# Patient Record
Sex: MALE | Race: WHITE | NOT HISPANIC OR LATINO
[De-identification: names, ages, dates, MRNs, and addresses within clinical notes are randomized per-mention and may not be internally consistent; named-entity substitution may affect disease eponyms.]

---

## 2017-07-20 ENCOUNTER — APPOINTMENT (OUTPATIENT)
Dept: CARDIOLOGY | Facility: CLINIC | Age: 67
End: 2017-07-20

## 2017-08-09 ENCOUNTER — APPOINTMENT (OUTPATIENT)
Dept: CARDIOLOGY | Facility: CLINIC | Age: 67
End: 2017-08-09
Payer: COMMERCIAL

## 2017-08-09 PROCEDURE — 93306 TTE W/DOPPLER COMPLETE: CPT

## 2017-08-29 ENCOUNTER — APPOINTMENT (OUTPATIENT)
Dept: CARDIOLOGY | Facility: CLINIC | Age: 67
End: 2017-08-29

## 2018-01-15 ENCOUNTER — APPOINTMENT (OUTPATIENT)
Dept: CARDIOLOGY | Facility: CLINIC | Age: 68
End: 2018-01-15
Payer: COMMERCIAL

## 2018-01-15 VITALS
BODY MASS INDEX: 25.46 KG/M2 | WEIGHT: 168 LBS | SYSTOLIC BLOOD PRESSURE: 122 MMHG | DIASTOLIC BLOOD PRESSURE: 80 MMHG | HEIGHT: 68 IN | HEART RATE: 74 BPM

## 2018-01-15 DIAGNOSIS — Z87.19 PERSONAL HISTORY OF OTHER DISEASES OF THE DIGESTIVE SYSTEM: ICD-10-CM

## 2018-01-15 DIAGNOSIS — G47.30 SLEEP APNEA, UNSPECIFIED: ICD-10-CM

## 2018-01-15 DIAGNOSIS — Z86.79 PERSONAL HISTORY OF OTHER DISEASES OF THE CIRCULATORY SYSTEM: ICD-10-CM

## 2018-01-15 PROCEDURE — 99214 OFFICE O/P EST MOD 30 MIN: CPT

## 2018-04-21 ENCOUNTER — MEDICATION RENEWAL (OUTPATIENT)
Age: 68
End: 2018-04-21

## 2018-05-17 ENCOUNTER — APPOINTMENT (OUTPATIENT)
Dept: CARDIOLOGY | Facility: CLINIC | Age: 68
End: 2018-05-17
Payer: COMMERCIAL

## 2018-05-17 PROCEDURE — 93351 STRESS TTE COMPLETE: CPT

## 2018-05-25 ENCOUNTER — APPOINTMENT (OUTPATIENT)
Dept: CARDIOLOGY | Facility: CLINIC | Age: 68
End: 2018-05-25
Payer: COMMERCIAL

## 2018-05-25 VITALS
DIASTOLIC BLOOD PRESSURE: 60 MMHG | HEIGHT: 68 IN | SYSTOLIC BLOOD PRESSURE: 128 MMHG | WEIGHT: 168 LBS | BODY MASS INDEX: 25.46 KG/M2 | HEART RATE: 76 BPM

## 2018-05-25 PROCEDURE — 99214 OFFICE O/P EST MOD 30 MIN: CPT

## 2018-05-25 RX ORDER — RANITIDINE 150 MG/1
150 TABLET ORAL
Qty: 42 | Refills: 0 | Status: DISCONTINUED | COMMUNITY
Start: 2018-01-03 | End: 2018-05-25

## 2018-05-25 RX ORDER — TOBRAMYCIN AND DEXAMETHASONE 3; 1 MG/ML; MG/ML
0.3-0.1 SUSPENSION/ DROPS OPHTHALMIC
Qty: 10 | Refills: 0 | Status: DISCONTINUED | COMMUNITY
Start: 2017-08-07 | End: 2018-05-25

## 2018-11-30 ENCOUNTER — RX RENEWAL (OUTPATIENT)
Age: 68
End: 2018-11-30

## 2018-12-07 ENCOUNTER — MEDICATION RENEWAL (OUTPATIENT)
Age: 68
End: 2018-12-07

## 2018-12-11 ENCOUNTER — RX RENEWAL (OUTPATIENT)
Age: 68
End: 2018-12-11

## 2019-05-24 ENCOUNTER — NON-APPOINTMENT (OUTPATIENT)
Age: 69
End: 2019-05-24

## 2019-05-24 ENCOUNTER — APPOINTMENT (OUTPATIENT)
Dept: CARDIOLOGY | Facility: CLINIC | Age: 69
End: 2019-05-24
Payer: COMMERCIAL

## 2019-05-24 VITALS
WEIGHT: 170 LBS | HEIGHT: 68 IN | HEART RATE: 70 BPM | OXYGEN SATURATION: 96 % | SYSTOLIC BLOOD PRESSURE: 140 MMHG | DIASTOLIC BLOOD PRESSURE: 80 MMHG | BODY MASS INDEX: 25.76 KG/M2

## 2019-05-24 PROCEDURE — 93000 ELECTROCARDIOGRAM COMPLETE: CPT

## 2019-05-24 PROCEDURE — 99214 OFFICE O/P EST MOD 30 MIN: CPT

## 2019-05-24 RX ORDER — OLMESARTAN MEDOXOMIL 20 MG/1
20 TABLET, FILM COATED ORAL DAILY
Qty: 7 | Refills: 0 | Status: DISCONTINUED | COMMUNITY
End: 2019-05-24

## 2019-05-24 NOTE — PHYSICAL EXAM
[General Appearance - Well Developed] : well developed [General Appearance - Well Nourished] : well nourished [Normal Appearance] : normal appearance [Well Groomed] : well groomed [Normal Conjunctiva] : the conjunctiva exhibited no abnormalities [General Appearance - In No Acute Distress] : no acute distress [No Deformities] : no deformities [Eyelids - No Xanthelasma] : the eyelids demonstrated no xanthelasmas [Normal Oral Mucosa] : normal oral mucosa [No Oral Pallor] : no oral pallor [No Oral Cyanosis] : no oral cyanosis [Normal Jugular Venous A Waves Present] : normal jugular venous A waves present [Normal Jugular Venous V Waves Present] : normal jugular venous V waves present [No Jugular Venous Chambers A Waves] : no jugular venous chambers A waves [Respiration, Rhythm And Depth] : normal respiratory rhythm and effort [Auscultation Breath Sounds / Voice Sounds] : lungs were clear to auscultation bilaterally [Exaggerated Use Of Accessory Muscles For Inspiration] : no accessory muscle use [Heart Rate And Rhythm] : heart rate and rhythm were normal [Heart Sounds] : normal S1 and S2 [Abdomen Soft] : soft [Murmurs] : no murmurs present [Abdomen Tenderness] : non-tender [Abnormal Walk] : normal gait [Abdomen Mass (___ Cm)] : no abdominal mass palpated [Nail Clubbing] : no clubbing of the fingernails [Gait - Sufficient For Exercise Testing] : the gait was sufficient for exercise testing [Skin Color & Pigmentation] : normal skin color and pigmentation [Petechial Hemorrhages (___cm)] : no petechial hemorrhages [Cyanosis, Localized] : no localized cyanosis [No Venous Stasis] : no venous stasis [Skin Lesions] : no skin lesions [] : no rash [No Xanthoma] : no  xanthoma was observed [No Skin Ulcers] : no skin ulcer [Oriented To Time, Place, And Person] : oriented to person, place, and time [Mood] : the mood was normal [No Anxiety] : not feeling anxious [Affect] : the affect was normal

## 2019-05-24 NOTE — HISTORY OF PRESENT ILLNESS
[FreeTextEntry1] : The patient is presenting here today for yearly cardiac followup visit. Patient is clinically doing very well. He exercises every day. He denies any chest pains or shortness of breath. No palpitations or syncope.

## 2020-12-30 ENCOUNTER — APPOINTMENT (OUTPATIENT)
Dept: CARDIOLOGY | Facility: CLINIC | Age: 70
End: 2020-12-30
Payer: COMMERCIAL

## 2020-12-30 ENCOUNTER — NON-APPOINTMENT (OUTPATIENT)
Age: 70
End: 2020-12-30

## 2020-12-30 VITALS
WEIGHT: 162 LBS | HEART RATE: 77 BPM | BODY MASS INDEX: 24.55 KG/M2 | SYSTOLIC BLOOD PRESSURE: 130 MMHG | HEIGHT: 68 IN | DIASTOLIC BLOOD PRESSURE: 80 MMHG | OXYGEN SATURATION: 98 %

## 2020-12-30 PROCEDURE — 99072 ADDL SUPL MATRL&STAF TM PHE: CPT

## 2020-12-30 PROCEDURE — 99214 OFFICE O/P EST MOD 30 MIN: CPT

## 2020-12-30 PROCEDURE — 93000 ELECTROCARDIOGRAM COMPLETE: CPT

## 2020-12-30 RX ORDER — LOSARTAN POTASSIUM 100 MG/1
100 TABLET, FILM COATED ORAL DAILY
Qty: 30 | Refills: 0 | Status: DISCONTINUED | COMMUNITY
Start: 2020-01-22 | End: 2020-12-30

## 2020-12-30 NOTE — ASSESSMENT
[FreeTextEntry1] : Hypertension appears to be controlled. he declined beta blocker in the past. He is on losartan. Patient is physically active but not exercising on a regular basis. He denies any chest pains or shortness of breath. Continue primary prevention. he had 3 beat nonsustained ventricular tachycardia during stress test. He declined Holter monitor. And again he declined beta blocker. He is not considering any further testing. Cardiac followup yearly and as needed.

## 2020-12-30 NOTE — PHYSICAL EXAM
[General Appearance - Well Developed] : well developed [Normal Appearance] : normal appearance [Well Groomed] : well groomed [General Appearance - Well Nourished] : well nourished [No Deformities] : no deformities [General Appearance - In No Acute Distress] : no acute distress [Normal Conjunctiva] : the conjunctiva exhibited no abnormalities [Eyelids - No Xanthelasma] : the eyelids demonstrated no xanthelasmas [Normal Oral Mucosa] : normal oral mucosa [No Oral Pallor] : no oral pallor [No Oral Cyanosis] : no oral cyanosis [Normal Jugular Venous A Waves Present] : normal jugular venous A waves present [Normal Jugular Venous V Waves Present] : normal jugular venous V waves present [No Jugular Venous Chambers A Waves] : no jugular venous chambers A waves [Respiration, Rhythm And Depth] : normal respiratory rhythm and effort [Exaggerated Use Of Accessory Muscles For Inspiration] : no accessory muscle use [Auscultation Breath Sounds / Voice Sounds] : lungs were clear to auscultation bilaterally [Heart Rate And Rhythm] : heart rate and rhythm were normal [Heart Sounds] : normal S1 and S2 [Murmurs] : no murmurs present [Abdomen Soft] : soft [Abdomen Tenderness] : non-tender [Abdomen Mass (___ Cm)] : no abdominal mass palpated [Abnormal Walk] : normal gait [Gait - Sufficient For Exercise Testing] : the gait was sufficient for exercise testing [Nail Clubbing] : no clubbing of the fingernails [Cyanosis, Localized] : no localized cyanosis [Petechial Hemorrhages (___cm)] : no petechial hemorrhages [Skin Color & Pigmentation] : normal skin color and pigmentation [] : no rash [No Venous Stasis] : no venous stasis [Skin Lesions] : no skin lesions [No Skin Ulcers] : no skin ulcer [No Xanthoma] : no  xanthoma was observed [Oriented To Time, Place, And Person] : oriented to person, place, and time [Affect] : the affect was normal [Mood] : the mood was normal [No Anxiety] : not feeling anxious

## 2021-08-09 ENCOUNTER — APPOINTMENT (OUTPATIENT)
Dept: OPHTHALMOLOGY | Facility: CLINIC | Age: 71
End: 2021-08-09

## 2021-12-21 ENCOUNTER — APPOINTMENT (OUTPATIENT)
Dept: CARDIOLOGY | Facility: CLINIC | Age: 71
End: 2021-12-21

## 2021-12-29 ENCOUNTER — NON-APPOINTMENT (OUTPATIENT)
Age: 71
End: 2021-12-29

## 2021-12-29 ENCOUNTER — APPOINTMENT (OUTPATIENT)
Dept: CARDIOLOGY | Facility: CLINIC | Age: 71
End: 2021-12-29
Payer: COMMERCIAL

## 2021-12-29 VITALS
HEIGHT: 68 IN | DIASTOLIC BLOOD PRESSURE: 78 MMHG | WEIGHT: 179 LBS | OXYGEN SATURATION: 95 % | SYSTOLIC BLOOD PRESSURE: 142 MMHG | HEART RATE: 80 BPM | BODY MASS INDEX: 27.13 KG/M2

## 2021-12-29 DIAGNOSIS — Z87.898 PERSONAL HISTORY OF OTHER SPECIFIED CONDITIONS: ICD-10-CM

## 2021-12-29 DIAGNOSIS — I47.2 VENTRICULAR TACHYCARDIA: ICD-10-CM

## 2021-12-29 DIAGNOSIS — Z00.00 ENCOUNTER FOR GENERAL ADULT MEDICAL EXAMINATION W/OUT ABNORMAL FINDINGS: ICD-10-CM

## 2021-12-29 PROCEDURE — 99214 OFFICE O/P EST MOD 30 MIN: CPT

## 2021-12-29 PROCEDURE — 93000 ELECTROCARDIOGRAM COMPLETE: CPT

## 2021-12-29 RX ORDER — IRBESARTAN 150 MG/1
150 TABLET ORAL DAILY
Qty: 90 | Refills: 3 | Status: ACTIVE | COMMUNITY
Start: 2021-12-29

## 2021-12-29 NOTE — DISCUSSION/SUMMARY
[FreeTextEntry1] : 72-year-old gentleman with above medical history and active medical problems as noted below\par 1.  Hypertensive heart disease with renal insufficiency no signs of CHF.  Mildly uncontrolled.  Reviewed risk benefits alternatives in presence of renal insufficiency.  Does not want to pursue any additional medication.  Consider addition of amlodipine to the present regimen.  Goal would be to try to lower it less than 130/80.  He will continue to manage sleep apnea.  Continue activity and exercise.  Strongly recommend him to decrease his salt intake.  Continue lower alcohol and caffeine intake.\par 2.  Dyslipidemia.  Elevated LDL cholesterol reviewed.  Risk benefits alternatives discussed.  Does not want to pursue statin therapy.  \par 3.  Murmur.  Prior echocardiogram 2017.  Hypertensive heart and renal disease.  Recommended echocardiogram to be followed.  We will review it on the phone.  He will contact me after the echocardiogram so that appropriate review can be done.\par #4 history of mild carotid atherosclerosis in the past.  Continue lifestyle risk factor modifications.  Again reviewed need for blood pressure control lifestyle modification and lipid management.\par #5 overweight state.  Weight reduction discussed.\par #6 PVCs.  Asymptomatic.  LV ejection fraction evaluation.  He has excellent exercise tolerance without symptoms to suggest unstable CAD.  No history of syncope.  In the past he had declined beta-blockers as per Dr. Jensen note.  And did not want to pursue stress test.\par \par Counseling regarding low saturated fat, salt and carbohydrate intake was reviewed. Active lifestyle and regular. Exercise along with weight management is advised.\par All the above were at length reviewed. Answered all the questions. Thank you very much for this kind referral. Please do not hesitate to give me a call for any question.\par Part of this transcription was done with voice recognition software and phonetically similar errors are common. I apologize for that. Please do not hesitate to call for any questions due to above.\par

## 2021-12-29 NOTE — ASSESSMENT
[FreeTextEntry1] : Reviewed on December 29, 2021\par Recent labs December 27, 2021 sodium 140 potassium 4.7 creatinine 1.43 LFT normal hemoglobin 15.6.\par Labs from August 3, 2021 were also reviewed.\par

## 2021-12-29 NOTE — REASON FOR VISIT
[Symptom and Test Evaluation] : symptom and test evaluation [Arrhythmia/ECG Abnorrmalities] : arrhythmia/ECG abnormalities [Hyperlipidemia] : hyperlipidemia [Hypertension] : hypertension [FreeTextEntry3] : Dr. Mendes [FreeTextEntry1] : 71-year-old gentleman comes in for follow-up consultation.  Last time seen by Dr. Cochran.\par He has no complaint of chest pain.  He has no significant shortness of breath PND orthopnea palpitation dizziness lightheadedness pedal edema\par He has no visual disturbances focal weakness\par He has no claudication pain\par He remains very active continue to work on a regular basis.\par He has no bleeding complications.\par He does like to eat a lot of salt.\par He has been vegan and dietary restrictions.\par He is unable to lose weight.\par He does have sleep apnea and uses CPAP recently according to him not being able to sleep well.

## 2021-12-29 NOTE — HISTORY OF PRESENT ILLNESS
[FreeTextEntry1] : Medical history mainly significant for\par 1.  Essential hypertension.\par 2.  Renal insufficiency.\par 3.  Dyslipidemia.\par 4.  Premature ventricular beats.\par 5.  Overweight state

## 2021-12-29 NOTE — PHYSICAL EXAM
[Well Developed] : well developed [Well Nourished] : well nourished [No Acute Distress] : no acute distress [Normal Conjunctiva] : normal conjunctiva [Normal Venous Pressure] : normal venous pressure [No Carotid Bruit] : no carotid bruit [Normal S1, S2] : normal S1, S2 [No Rub] : no rub [No Gallop] : no gallop [Clear Lung Fields] : clear lung fields [Good Air Entry] : good air entry [No Respiratory Distress] : no respiratory distress  [Normal Gait] : normal gait [No Edema] : no edema [No Cyanosis] : no cyanosis [No Clubbing] : no clubbing [No Varicosities] : no varicosities [No Rash] : no rash [Moves all extremities] : moves all extremities [No Focal Deficits] : no focal deficits [Normal Speech] : normal speech [Alert and Oriented] : alert and oriented [Normal memory] : normal memory [de-identified] : Midsystolic murmur.  1-2 over 6.  At the base. [de-identified] : No bruit

## 2022-01-03 ENCOUNTER — APPOINTMENT (OUTPATIENT)
Dept: OPHTHALMOLOGY | Facility: CLINIC | Age: 72
End: 2022-01-03
Payer: COMMERCIAL

## 2022-01-03 ENCOUNTER — NON-APPOINTMENT (OUTPATIENT)
Age: 72
End: 2022-01-03

## 2022-01-03 PROCEDURE — 92020 GONIOSCOPY: CPT

## 2022-01-03 PROCEDURE — 92014 COMPRE OPH EXAM EST PT 1/>: CPT

## 2022-01-26 ENCOUNTER — APPOINTMENT (OUTPATIENT)
Dept: CARDIOLOGY | Facility: CLINIC | Age: 72
End: 2022-01-26
Payer: COMMERCIAL

## 2022-01-26 PROCEDURE — 93306 TTE W/DOPPLER COMPLETE: CPT

## 2022-01-28 ENCOUNTER — NON-APPOINTMENT (OUTPATIENT)
Age: 72
End: 2022-01-28

## 2022-01-31 ENCOUNTER — NON-APPOINTMENT (OUTPATIENT)
Age: 72
End: 2022-01-31

## 2022-08-12 ENCOUNTER — NON-APPOINTMENT (OUTPATIENT)
Age: 72
End: 2022-08-12

## 2022-08-25 ENCOUNTER — NON-APPOINTMENT (OUTPATIENT)
Age: 72
End: 2022-08-25

## 2022-10-03 ENCOUNTER — APPOINTMENT (OUTPATIENT)
Dept: OPHTHALMOLOGY | Facility: CLINIC | Age: 72
End: 2022-10-03

## 2022-10-05 ENCOUNTER — APPOINTMENT (OUTPATIENT)
Dept: OPHTHALMOLOGY | Facility: CLINIC | Age: 72
End: 2022-10-05

## 2022-10-12 ENCOUNTER — APPOINTMENT (OUTPATIENT)
Dept: OPHTHALMOLOGY | Facility: CLINIC | Age: 72
End: 2022-10-12

## 2022-10-12 ENCOUNTER — NON-APPOINTMENT (OUTPATIENT)
Age: 72
End: 2022-10-12

## 2022-10-12 PROCEDURE — 92014 COMPRE OPH EXAM EST PT 1/>: CPT

## 2022-10-12 PROCEDURE — 92020 GONIOSCOPY: CPT

## 2023-01-10 ENCOUNTER — APPOINTMENT (OUTPATIENT)
Dept: CARDIOLOGY | Facility: CLINIC | Age: 73
End: 2023-01-10
Payer: COMMERCIAL

## 2023-01-10 ENCOUNTER — NON-APPOINTMENT (OUTPATIENT)
Age: 73
End: 2023-01-10

## 2023-01-10 VITALS
DIASTOLIC BLOOD PRESSURE: 100 MMHG | WEIGHT: 176 LBS | BODY MASS INDEX: 26.76 KG/M2 | HEART RATE: 75 BPM | TEMPERATURE: 97.8 F | SYSTOLIC BLOOD PRESSURE: 156 MMHG | OXYGEN SATURATION: 95 %

## 2023-01-10 PROCEDURE — 93000 ELECTROCARDIOGRAM COMPLETE: CPT

## 2023-01-10 PROCEDURE — 99214 OFFICE O/P EST MOD 30 MIN: CPT | Mod: 25

## 2023-01-10 NOTE — ASSESSMENT
[FreeTextEntry1] : Hypertension appears to be controlled. he declined beta blocker in the past. He is on losartan. Patient is physically active but not exercising on a regular basis. He denies any chest pains or shortness of breath. Continue primary prevention. he had 3 beat nonsustained ventricular tachycardia during stress test. He declined Holter monitor. And again he declined beta blocker. He is not considering any further testing. Cardiac followup yearly and as needed.\par I rechecked his blood pressure today.  It was 130/78.  Exercise and low-sodium diet discussed with the patient.  ECG was reviewed.  No changes.

## 2023-02-17 ENCOUNTER — NON-APPOINTMENT (OUTPATIENT)
Age: 73
End: 2023-02-17

## 2023-02-22 ENCOUNTER — NON-APPOINTMENT (OUTPATIENT)
Age: 73
End: 2023-02-22

## 2023-04-29 ENCOUNTER — NON-APPOINTMENT (OUTPATIENT)
Age: 73
End: 2023-04-29

## 2023-05-19 ENCOUNTER — NON-APPOINTMENT (OUTPATIENT)
Age: 73
End: 2023-05-19

## 2023-05-19 ENCOUNTER — APPOINTMENT (OUTPATIENT)
Dept: OPHTHALMOLOGY | Facility: CLINIC | Age: 73
End: 2023-05-19
Payer: COMMERCIAL

## 2023-05-19 PROCEDURE — 99214 OFFICE O/P EST MOD 30 MIN: CPT

## 2023-05-20 ENCOUNTER — NON-APPOINTMENT (OUTPATIENT)
Age: 73
End: 2023-05-20

## 2023-10-06 ENCOUNTER — NON-APPOINTMENT (OUTPATIENT)
Age: 73
End: 2023-10-06

## 2024-01-18 ENCOUNTER — APPOINTMENT (OUTPATIENT)
Dept: CARDIOLOGY | Facility: CLINIC | Age: 74
End: 2024-01-18
Payer: COMMERCIAL

## 2024-01-18 ENCOUNTER — NON-APPOINTMENT (OUTPATIENT)
Age: 74
End: 2024-01-18

## 2024-01-18 VITALS
HEART RATE: 72 BPM | WEIGHT: 180 LBS | SYSTOLIC BLOOD PRESSURE: 148 MMHG | OXYGEN SATURATION: 96 % | BODY MASS INDEX: 27.28 KG/M2 | HEIGHT: 68 IN | DIASTOLIC BLOOD PRESSURE: 88 MMHG

## 2024-01-18 DIAGNOSIS — M26.649 ARTHRITIS OF UNSPECIFIED TEMPOROMANDIBULAR JOINT: ICD-10-CM

## 2024-01-18 PROCEDURE — 93000 ELECTROCARDIOGRAM COMPLETE: CPT

## 2024-01-18 PROCEDURE — 99204 OFFICE O/P NEW MOD 45 MIN: CPT | Mod: 25

## 2024-01-18 RX ORDER — MELOXICAM 7.5 MG/1
7.5 TABLET ORAL DAILY
Qty: 60 | Refills: 1 | Status: DISCONTINUED | COMMUNITY
Start: 2024-01-18 | End: 2024-01-18

## 2024-01-18 NOTE — DISCUSSION/SUMMARY
[EKG obtained to assist in diagnosis and management of assessed problem(s)] : EKG obtained to assist in diagnosis and management of assessed problem(s) [FreeTextEntry1] : Patient very pleasant 73-year-old male who presents the office today for his annual cardiac evaluation.  Patient has been seen here previously by Dr. oCchran who is now retired.  He has known hypertension and is taking and tolerant of the irbesartan at 150 mg/day.  Blood pressure is modestly elevated today as he is under stress and in continued pain from right TMJ problems.  Patient has had an MRI at his request to reveal degenerative changes.  He is chronically taking Excedrin and is very interested in having his sleep massk changed as he feels this is pushing his jaw on and causing the arthritic condition..  Patient notes that he is active he is without any suspicious cardiovascular symptoms with exercise.  Blood pressure is modestly elevated but he prefers to process things holistically.  At this point in time he was reassured regarding his cardiovascular status based on his interval cardiac review of systems, physical exam as well as electrocardiogram.  Last available labs in February 2023 were entirely within normal limits with exception of BUN 30 creatinine 1.26 cholesterol 192 HDL 56 triglycerides 98 .  Patient was recommended to consider arthritis strength Tylenol and CBD for his jaw pain.  He was given the name of alternative ear nose and throat doctors for evaluation of possible injection into the TMJ.  He was recommended to remain active, lose 5 pounds and cautioned that if he did not his blood pressure would likely need to be treated in a more aggressive fashion.  Patient return back to the office in 6 weeks for an updated weight, blood pressure check and echocardiogram to evaluate for hypertensive changes related to his blood pressure and to evaluate previously noted mild valvular issues.  Updated laboratory data was requested and he will be called and results are obtained otherwise return in 6 weeks for weight, blood pressure check and echocardiogram.  Joel Goldberg, MD, FACC

## 2024-01-18 NOTE — PHYSICAL EXAM
[Well Developed] : well developed [Well Nourished] : well nourished [No Acute Distress] : no acute distress [Normal Conjunctiva] : normal conjunctiva [No Xanthelasma] : no xanthelasma [Normal Venous Pressure] : normal venous pressure [No Carotid Bruit] : no carotid bruit [Elevated JVD ____cm] : elevated JVD ~Vcm [Normal S1, S2] : normal S1, S2 [No Murmur] : no murmur [No Rub] : no rub [No Gallop] : no gallop [Clear Lung Fields] : clear lung fields [Good Air Entry] : good air entry [No Respiratory Distress] : no respiratory distress  [Soft] : abdomen soft [Non Tender] : non-tender [No Masses/organomegaly] : no masses/organomegaly [Normal] : normal gait [No Edema] : no edema [No Cyanosis] : no cyanosis [No Clubbing] : no clubbing [No Focal Deficits] : no focal deficits

## 2024-01-18 NOTE — CARDIOLOGY SUMMARY
[de-identified] : (1/18/2024): EKG: NSR 66 bpm with a frontal QRS axis -15 degrees otherwise normal trace. [de-identified] : (5/17/2018) Stoney protocol stress test 6 minutes 44 seconds resting heart rate 75 peak 139 92% of predicted heart rate for age.  Blood pressure 138/86 peaking to 204/104.  Baseline echo normal LV internal dimensions and wall thickness no segmental motion abnormalities EF 60.  Conclusions normal hemodynamic response, normal EKG response, augmentation of left ventricular systolic function.  Appropriate heart response hypertensive response test performed on Benicar conclusion normal recommendations initiate Toprol XL 25 patient refused and Holter monitor advised had 3 beats of nonsustained VT [de-identified] : (1/226/2022) ECHOCARDIOGRAM CONCLUSIONS: Mildly thickened mitral valve with mild mitral regurgitation.  Sclerotic 3 leaflet aortic valve with minimal AI.  Normal LA size with an index volume of 24 cc/m.  Mild concentric LVH.  Normal left ventricular dimension and function with an EF of 60%.  Normal diastolic function, normal right atrium, normal right ventricular size and function.  Normal PA pressures of 24.  No significant change compared with 5/17/2018

## 2024-01-18 NOTE — REASON FOR VISIT
[CV Risk Factors and Non-Cardiac Disease] : CV risk factors and non-cardiac disease [Hypertension] : hypertension [FreeTextEntry3] : Dr. Mendes [FreeTextEntry1] : Patient is a 73-year-old white male with known hypertension, previously under the care of Dr. Cochran who has retired.  The patient presents to the office today for his annual follow-up.  Patient notes that he has had no active cardiovascular symptoms but has chronic pain from TMJ, known hypertension for which she is tolerating his ARB but blood pressure is mildly elevated as per the patient's perception from chronic pain and stress.  He presents today to continue his ongoing care with no new or active cardiac concerns or complaints

## 2024-01-18 NOTE — HISTORY OF PRESENT ILLNESS
[FreeTextEntry1] : Patient is a 73-year-old white male well-known to this office.  Previously under the care of Dr. Jensen he is under the primary care of Dr. Mendes has not seen him recently and last set available labs are from February 2023.  Patient has known sleep apnea he wears a mask that he feels is pushing his jaw in and has developed severe TMJ on the right side.  He recently had air travel developed blood behind his right eardrum and temporary loss of hearing.  He did in fact see an ear nose and throat physician and had an MRI as per his own record that revealed severe arthritic changes in his TMJ.  He has been on Excedrin with no significant benefit, he is not sleeping well, under stress and he thinks that may be contributing to his blood pressure which is modestly elevated today.  He is taking his irbesartan at 150 mg/day and remains active.  He does high intensity workouts over a brief period of time and denies headaches, shortness of breath, palpitations and or chest discomfort.  He remains active and normal daily activities also did not provoke any symptomatology.  He presents today to continue his ongoing care with these issues being a primary importance.  Patient notes that he is holistic, is a vegan, has not had a colonoscopy but was unaware Cologuard exists and will discuss with his primary care physician obtaining that evaluation.  Patient otherwise presents with no new or active cardiac concerns or complaints to continue his ongoing cardiac monitoring.

## 2024-01-18 NOTE — REVIEW OF SYSTEMS
[Earache] : earache [Hearing Loss] : hearing loss [Negative] : Neurological [SOB] : no shortness of breath [Dyspnea on exertion] : not dyspnea during exertion [Chest Discomfort] : no chest discomfort [Lower Ext Edema] : no extremity edema [Leg Claudication] : no intermittent leg claudication [Palpitations] : no palpitations [Orthopnea] : no orthopnea [PND] : no PND [Syncope] : no syncope [FreeTextEntry2] : Chronic right TMJ pain

## 2024-01-23 ENCOUNTER — APPOINTMENT (OUTPATIENT)
Dept: OTOLARYNGOLOGY | Facility: CLINIC | Age: 74
End: 2024-01-23

## 2024-02-04 ENCOUNTER — RX RENEWAL (OUTPATIENT)
Age: 74
End: 2024-02-04

## 2024-02-04 RX ORDER — MELOXICAM 7.5 MG/1
7.5 TABLET ORAL
Qty: 21 | Refills: 1 | Status: ACTIVE | COMMUNITY
Start: 2024-01-18 | End: 1900-01-01

## 2024-02-08 ENCOUNTER — APPOINTMENT (OUTPATIENT)
Dept: OTOLARYNGOLOGY | Facility: CLINIC | Age: 74
End: 2024-02-08
Payer: COMMERCIAL

## 2024-02-08 VITALS — HEIGHT: 68 IN | BODY MASS INDEX: 25.76 KG/M2 | WEIGHT: 170 LBS

## 2024-02-08 PROCEDURE — 99203 OFFICE O/P NEW LOW 30 MIN: CPT

## 2024-02-14 NOTE — HISTORY OF PRESENT ILLNESS
[de-identified] : co rt ear 3/23 ad plugging dx ben, had m and t 5/23 longstanding ad mild hearing loss longstanding tinnitus covid 8/22 and tinnitus increased mri jaw  w bone spurs hx sleep apnea co nasal congestion

## 2024-02-14 NOTE — ASSESSMENT
[FreeTextEntry1] : audio 7/23 sn loss symmetric pre  m and t audio w cond loss mri 6/21/23  w condylar head bone spur and fluid noted rt me tmj probable source of otalgia ad vent tube good position rec dental consult re tmj

## 2024-03-14 ENCOUNTER — APPOINTMENT (OUTPATIENT)
Dept: CARDIOLOGY | Facility: CLINIC | Age: 74
End: 2024-03-14
Payer: COMMERCIAL

## 2024-03-14 VITALS
HEIGHT: 68 IN | DIASTOLIC BLOOD PRESSURE: 78 MMHG | WEIGHT: 170 LBS | HEART RATE: 68 BPM | OXYGEN SATURATION: 98 % | SYSTOLIC BLOOD PRESSURE: 138 MMHG | BODY MASS INDEX: 25.76 KG/M2

## 2024-03-14 DIAGNOSIS — I10 ESSENTIAL (PRIMARY) HYPERTENSION: ICD-10-CM

## 2024-03-14 DIAGNOSIS — E78.5 HYPERLIPIDEMIA, UNSPECIFIED: ICD-10-CM

## 2024-03-14 DIAGNOSIS — G47.33 OBSTRUCTIVE SLEEP APNEA (ADULT) (PEDIATRIC): ICD-10-CM

## 2024-03-14 DIAGNOSIS — I13.10 HYPERTENSIVE HEART AND CHRONIC KIDNEY DISEASE W/OUT HEART FAILURE, WITH STAGE 1 THROUGH STAGE 4 CHRONIC KIDNEY DISEASE, OR UNSPECIFIED CHRONIC KIDNEY DISEASE: ICD-10-CM

## 2024-03-14 DIAGNOSIS — I35.1 NONRHEUMATIC AORTIC (VALVE) INSUFFICIENCY: ICD-10-CM

## 2024-03-14 PROCEDURE — 99213 OFFICE O/P EST LOW 20 MIN: CPT

## 2024-03-14 PROCEDURE — 93306 TTE W/DOPPLER COMPLETE: CPT

## 2024-03-14 RX ORDER — AMLODIPINE BESYLATE 2.5 MG/1
2.5 TABLET ORAL
Qty: 90 | Refills: 3 | Status: ACTIVE | COMMUNITY
Start: 2024-03-14 | End: 1900-01-01

## 2024-03-14 NOTE — REASON FOR VISIT
[CV Risk Factors and Non-Cardiac Disease] : CV risk factors and non-cardiac disease [Structural Heart and Valve Disease] : structural heart and valve disease [Hypertension] : hypertension [FreeTextEntry3] : Dr. Mendes [FreeTextEntry1] : Patient is a 73-year-old white male with known hypertension, previously noted mild aortic insufficiency who presents the office today for a weight, blood pressure check and an updated echocardiogram to follow the progression of his known aortic insufficiency.  Patient presents today generally feeling well, taking and tolerating irbesartan as his only antihypertensive therapy and has a new set of blood work on hand revealing that his lipids are now in the low risk range using dietary measures only.

## 2024-03-14 NOTE — REVIEW OF SYSTEMS
[Earache] : earache [Hearing Loss] : hearing loss [Dyspnea on exertion] : not dyspnea during exertion [SOB] : no shortness of breath [Chest Discomfort] : no chest discomfort [Lower Ext Edema] : no extremity edema [Leg Claudication] : no intermittent leg claudication [Palpitations] : no palpitations [PND] : no PND [Orthopnea] : no orthopnea [Syncope] : no syncope [Under Stress] : under stress [Negative] : Neurological [FreeTextEntry2] : Chronic right TMJ pain

## 2024-03-14 NOTE — CARDIOLOGY SUMMARY
[de-identified] : Echo: (1/226/2022) ECHOCARDIOGRAM CONCLUSIONS: Mildly thickened mitral valve with mild mitral regurgitation. Sclerotic 3 leaflet aortic valve with minimal AI. Normal LA size with an index volume of 24 cc/m. Mild concentric LVH. Normal left ventricular dimension and function with an EF of 60%. Normal diastolic function, normal right atrium, normal right ventricular size and function. Normal PA pressures of 24. No significant change compared with    (3/14/2024) ECHOCARDIOGRAPHIC CONCLUSIONS:  Left ventricular systolic function is normal with an ejection fraction of 60 % by Warren's method of disks with an ejection fraction visually estimated at 60 to 65 %. PVC's noted during exam.Mild left ventricular hypertrophy.Normal left ventricular diastolic function.Ascending aorta diameter is dilated, measuring 3.90 cm (indexed 2.04 cm/m).There is mild calcification of the aortic valve leaflets.Moderate aortic regurgitation with a pressure half-time of 585 m/s.  Diastolic and systolic dimensions are within normal limits with an LVEDD of 4.7 cm and an LVESD of 2.2 cm.. Mild mitral valve leaflet calcification.Trace mitral regurgitation.Mild tricuspid regurgitation.Mild pulmonic regurgitation.Estimated pulmonary artery systolic pressure is 27 mmHg.Normal pericardium.No pericardial effusion seen.No prior echocardiogram is available for comparison.

## 2024-03-14 NOTE — DISCUSSION/SUMMARY
[FreeTextEntry1] : Patient is very pleasant 73-year-old male with known hypertension, obstructive sleep apnea, mild to moderate aortic insufficiency who is currently on irbesartan 150 mg yet his blood pressure is less than ideal.  There was lability noted today with initial pressure as high as 172/105 dropping down to 142/90 after 5 minutes.  Earlier blood pressures of the day were within normal limits.  Patient's echocardiogram was reviewed in detail.  He has mild LVH, mild to moderate AI with a pressure half-time of 585 m/s.  The remainder of his heart was structurally sound without evidence of diastolic dysfunction and no LV enlargement.  There was also minimal dilatation of his aortic root.  Patient was informed that additional afterload reduction therapy would be ideal to minimize the long-term effects of aortic insufficiency.  An extensive discussion occurred and he has agreed to initiate amlodipine at 2.5 mg initially with a goal to titrate upwards to 5 mg if tolerated.  Salt restriction, weight loss and continued aerobic exercise was once again recommended.  He was advised to avoid weight lifting with breath-holding.  Patient understands the rationale behind this recommendation.  He will implement the therapy and was recommended to return within 4 months for repeat blood pressure check and reevaluation of his weight loss efforts and exercise routine.  He will continue to use his sleep mask and work on holistic ways to deal with his TMJ pain.  Joel Goldberg, MD, FACC

## 2024-03-14 NOTE — HISTORY OF PRESENT ILLNESS
[FreeTextEntry1] : Patient is a 73-year-old white male well-known to this office.  Previously under the care of Dr. Calloway who is retired.  He was originally seen by me on January 18 and recommended to lose weight, cut salt, become more aggressive with a low-fat diet and return for an updated weight, blood pressure check and echocardiogram to follow the progression of his known aortic insufficiency.   Patient has known sleep apnea he wears a mask that he feels is pushing his jaw in and has developed severe TMJ on the right side.  He is considering injections as acupuncture his failed to help.  He does recognize sleep apnea as a contributing factor to his blood pressure which he acknowledges is labile because of stress.  A temporary loss of hearing after an airplane trip prompted an ENT workup that included an MRI as per his own record that revealed severe arthritic changes in his TMJ.  At the time of his initial visit he noted that he has been on Excedrin with no significant benefit, he was not sleeping well, under stress and he thinks that may be contributing to his blood pressure which is modestly elevated today.  He is taking his irbesartan at 150 mg/day and remains active.  He does high intensity workouts over a brief period of time and denies headaches, shortness of breath, palpitations and or chest discomfort.  He remains active and normal daily activities also did not provoke any symptomatology  Patient noted at his initial visit that he desires to remain holistic, is a vegan and reluctant to consider any additional medication, denies further stress testing and is not interested in any medications that might affect erectile function.

## 2024-05-22 ENCOUNTER — APPOINTMENT (OUTPATIENT)
Dept: OPHTHALMOLOGY | Facility: CLINIC | Age: 74
End: 2024-05-22
Payer: COMMERCIAL

## 2024-05-22 ENCOUNTER — NON-APPOINTMENT (OUTPATIENT)
Age: 74
End: 2024-05-22

## 2024-05-22 PROCEDURE — 92020 GONIOSCOPY: CPT

## 2024-05-22 PROCEDURE — 92014 COMPRE OPH EXAM EST PT 1/>: CPT

## 2024-06-13 ENCOUNTER — APPOINTMENT (OUTPATIENT)
Dept: OPHTHALMOLOGY | Facility: CLINIC | Age: 74
End: 2024-06-13
Payer: SELF-PAY

## 2024-06-13 ENCOUNTER — NON-APPOINTMENT (OUTPATIENT)
Age: 74
End: 2024-06-13

## 2024-06-13 PROCEDURE — 92015 DETERMINE REFRACTIVE STATE: CPT

## 2024-07-15 ENCOUNTER — NON-APPOINTMENT (OUTPATIENT)
Age: 74
End: 2024-07-15

## 2024-07-15 ENCOUNTER — APPOINTMENT (OUTPATIENT)
Dept: OPHTHALMOLOGY | Facility: CLINIC | Age: 74
End: 2024-07-15
Payer: COMMERCIAL

## 2024-07-15 PROCEDURE — 99213 OFFICE O/P EST LOW 20 MIN: CPT

## 2024-07-18 ENCOUNTER — APPOINTMENT (OUTPATIENT)
Dept: CARDIOLOGY | Facility: CLINIC | Age: 74
End: 2024-07-18

## 2024-10-17 ENCOUNTER — NON-APPOINTMENT (OUTPATIENT)
Age: 74
End: 2024-10-17

## 2024-10-17 ENCOUNTER — APPOINTMENT (OUTPATIENT)
Dept: OPHTHALMOLOGY | Facility: CLINIC | Age: 74
End: 2024-10-17
Payer: COMMERCIAL

## 2024-10-17 PROCEDURE — 92012 INTRM OPH EXAM EST PATIENT: CPT

## 2025-05-22 ENCOUNTER — NON-APPOINTMENT (OUTPATIENT)
Age: 75
End: 2025-05-22

## 2025-05-22 ENCOUNTER — APPOINTMENT (OUTPATIENT)
Dept: OPHTHALMOLOGY | Facility: CLINIC | Age: 75
End: 2025-05-22
Payer: COMMERCIAL

## 2025-05-22 PROCEDURE — 92020 GONIOSCOPY: CPT

## 2025-05-22 PROCEDURE — 92014 COMPRE OPH EXAM EST PT 1/>: CPT

## 2025-05-28 ENCOUNTER — APPOINTMENT (OUTPATIENT)
Dept: CARDIOLOGY | Facility: CLINIC | Age: 75
End: 2025-05-28
Payer: COMMERCIAL

## 2025-05-28 ENCOUNTER — NON-APPOINTMENT (OUTPATIENT)
Age: 75
End: 2025-05-28

## 2025-05-28 VITALS
WEIGHT: 164 LBS | OXYGEN SATURATION: 97 % | BODY MASS INDEX: 24.94 KG/M2 | DIASTOLIC BLOOD PRESSURE: 86 MMHG | HEART RATE: 71 BPM | SYSTOLIC BLOOD PRESSURE: 138 MMHG

## 2025-05-28 DIAGNOSIS — I13.10 HYPERTENSIVE HEART AND CHRONIC KIDNEY DISEASE W/OUT HEART FAILURE, WITH STAGE 1 THROUGH STAGE 4 CHRONIC KIDNEY DISEASE, OR UNSPECIFIED CHRONIC KIDNEY DISEASE: ICD-10-CM

## 2025-05-28 DIAGNOSIS — G47.33 OBSTRUCTIVE SLEEP APNEA (ADULT) (PEDIATRIC): ICD-10-CM

## 2025-05-28 DIAGNOSIS — I35.1 NONRHEUMATIC AORTIC (VALVE) INSUFFICIENCY: ICD-10-CM

## 2025-05-28 DIAGNOSIS — I10 ESSENTIAL (PRIMARY) HYPERTENSION: ICD-10-CM

## 2025-05-28 PROCEDURE — 93000 ELECTROCARDIOGRAM COMPLETE: CPT

## 2025-05-28 PROCEDURE — 99214 OFFICE O/P EST MOD 30 MIN: CPT

## 2025-05-28 RX ORDER — TADALAFIL 20 MG/1
20 TABLET, FILM COATED ORAL
Refills: 0 | Status: ACTIVE | COMMUNITY

## 2025-05-28 RX ORDER — ALPRAZOLAM 0.25 MG/1
0.25 TABLET ORAL 3 TIMES DAILY
Qty: 90 | Refills: 0 | Status: ACTIVE | COMMUNITY
Start: 2025-05-28 | End: 1900-01-01

## 2025-05-28 RX ORDER — FLUTICASONE PROPIONATE 110 UG/1
110 AEROSOL, METERED RESPIRATORY (INHALATION) TWICE DAILY
Qty: 1 | Refills: 0 | Status: ACTIVE | COMMUNITY
Start: 2025-05-28 | End: 1900-01-01

## 2025-06-24 ENCOUNTER — RX RENEWAL (OUTPATIENT)
Age: 75
End: 2025-06-24

## 2025-07-18 ENCOUNTER — APPOINTMENT (OUTPATIENT)
Dept: CARDIOLOGY | Facility: CLINIC | Age: 75
End: 2025-07-18
Payer: COMMERCIAL

## 2025-07-18 VITALS
BODY MASS INDEX: 26.3 KG/M2 | OXYGEN SATURATION: 99 % | DIASTOLIC BLOOD PRESSURE: 80 MMHG | WEIGHT: 173 LBS | SYSTOLIC BLOOD PRESSURE: 142 MMHG | HEART RATE: 85 BPM

## 2025-07-18 PROCEDURE — 99213 OFFICE O/P EST LOW 20 MIN: CPT

## 2025-07-18 PROCEDURE — 93306 TTE W/DOPPLER COMPLETE: CPT

## 2025-07-20 LAB — HBA1C MFR BLD HPLC: 5.7

## 2025-09-18 ENCOUNTER — APPOINTMENT (OUTPATIENT)
Dept: CARDIOLOGY | Facility: CLINIC | Age: 75
End: 2025-09-18